# Patient Record
Sex: MALE | URBAN - METROPOLITAN AREA
[De-identification: names, ages, dates, MRNs, and addresses within clinical notes are randomized per-mention and may not be internally consistent; named-entity substitution may affect disease eponyms.]

---

## 2024-01-04 ENCOUNTER — OFFICE VISIT (OUTPATIENT)
Age: 54
End: 2024-01-04

## 2024-01-04 VITALS
BODY MASS INDEX: 37.16 KG/M2 | HEIGHT: 73 IN | TEMPERATURE: 98.9 F | SYSTOLIC BLOOD PRESSURE: 116 MMHG | DIASTOLIC BLOOD PRESSURE: 75 MMHG | RESPIRATION RATE: 17 BRPM | WEIGHT: 280.4 LBS | HEART RATE: 85 BPM | OXYGEN SATURATION: 92 %

## 2024-01-04 DIAGNOSIS — J34.89 NASAL PAIN: ICD-10-CM

## 2024-01-04 DIAGNOSIS — R09.81 NASAL CONGESTION: Primary | ICD-10-CM

## 2024-01-04 RX ORDER — BUPROPION HYDROCHLORIDE 300 MG/1
300 TABLET ORAL EVERY MORNING
COMMUNITY

## 2024-01-04 RX ORDER — MODAFINIL 100 MG/1
100 TABLET ORAL 2 TIMES DAILY
COMMUNITY

## 2024-01-04 RX ORDER — LISINOPRIL 10 MG/1
10 TABLET ORAL DAILY
COMMUNITY

## 2024-01-04 RX ORDER — DULOXETIN HYDROCHLORIDE 30 MG/1
30 CAPSULE, DELAYED RELEASE ORAL 3 TIMES DAILY
COMMUNITY

## 2024-01-04 RX ORDER — ATORVASTATIN CALCIUM 40 MG/1
40 TABLET, FILM COATED ORAL DAILY
COMMUNITY

## 2024-01-04 RX ORDER — MONTELUKAST SODIUM 10 MG/1
10 TABLET ORAL NIGHTLY
COMMUNITY

## 2024-01-04 RX ORDER — GLIPIZIDE 5 MG/1
5 TABLET ORAL DAILY
COMMUNITY

## 2024-01-04 RX ORDER — PIOGLITAZONEHYDROCHLORIDE 30 MG/1
30 TABLET ORAL DAILY
COMMUNITY

## 2024-01-04 RX ORDER — CETIRIZINE HYDROCHLORIDE 10 MG/1
10 TABLET ORAL DAILY
COMMUNITY

## 2024-01-04 RX ORDER — DICLOFENAC SODIUM 75 MG/1
75 TABLET, DELAYED RELEASE ORAL 2 TIMES DAILY
COMMUNITY

## 2024-01-04 RX ORDER — AMOXICILLIN AND CLAVULANATE POTASSIUM 875; 125 MG/1; MG/1
1 TABLET, FILM COATED ORAL 2 TIMES DAILY
Qty: 20 TABLET | Refills: 0 | Status: SHIPPED | OUTPATIENT
Start: 2024-01-04 | End: 2024-01-14

## 2024-01-04 ASSESSMENT — ENCOUNTER SYMPTOMS
SINUS PAIN: 0
EYE REDNESS: 0
COUGH: 0
SINUS PRESSURE: 0
RHINORRHEA: 0
EYE PAIN: 0
SORE THROAT: 0
EYE DISCHARGE: 0

## 2024-01-04 NOTE — PROGRESS NOTES
Eric Snider (:  1970) is a 53 y.o. male,New patient, here for evaluation of the following chief complaint(s):  Eye Problem (Left eye problem x 1 day.)      ASSESSMENT/PLAN:    ICD-10-CM    1. Nasal congestion  R09.81       2. Nasal pain  J34.89 amoxicillin-clavulanate (AUGMENTIN) 875-125 MG per tablet        Cannot visualize way up into right nasal passage where he is having discomfort.. (?) local infection, foreign body(?)..  --no nasal obstruction or discharge    Keep hydrated, tylenol or ibuprofen (if no contraindications) as needed if pain or fever..  call PCP office for follow up appointment or get referral to ENT    go to the ER if symptoms worse/feeling worse or has new symptoms or concerns (fever, facial swelling, increasing pain, discharge from eye or nose)  Saline nasal spray as discussed,  over the counter zyrtec once a day     SUBJECTIVE/OBJECTIVE:  Patient presents with:  Eye Problem: Left (not his left eye) eye problem x 1 day.  Problem is not with his right eye  Has pain along upper lateral right side of nose (bridge of nose), worse today associated with more swelling.   No fever, no trauma, no nasal discharge, no right eye discharge or trouble with vision...no facial rash,   has been using nasal lavage         History provided by:  Patient   used: No    Eye Problem   Pertinent negatives include no eye discharge, eye redness or fever.       Vitals:    24 1637   BP: 116/75   Pulse: 85   Resp: 17   Temp: 98.9 °F (37.2 °C)   SpO2: 92%   Weight: 127.2 kg (280 lb 6.4 oz)   Height: 1.854 m (6' 1\")       Review of Systems   Constitutional:  Negative for fever.   HENT:  Positive for congestion. Negative for ear pain, nosebleeds, rhinorrhea, sinus pressure, sinus pain and sore throat.         Right side nasal pain   Eyes:  Negative for pain, discharge, redness and visual disturbance.   Respiratory:  Negative for cough.    Musculoskeletal:  Negative for myalgias.   Skin:

## 2024-01-04 NOTE — PATIENT INSTRUCTIONS
Keep hydrated, tylenol or ibuprofen (if no contraindications) as needed if pain or fever..  call PCP office for follow up appointment or get referral to ENT    go to the ER if symptoms worse/feeling worse or has new symptoms or concerns (fever, facial swelling, increasing pain, discharge from eye or nose)  Saline nasal spray as discussed,  over the counter zyrtec once a day